# Patient Record
Sex: MALE | Race: WHITE
[De-identification: names, ages, dates, MRNs, and addresses within clinical notes are randomized per-mention and may not be internally consistent; named-entity substitution may affect disease eponyms.]

---

## 2020-07-25 ENCOUNTER — HOSPITAL ENCOUNTER (EMERGENCY)
Dept: HOSPITAL 63 - ER | Age: 40
Discharge: HOME | End: 2020-07-25
Payer: SELF-PAY

## 2020-07-25 VITALS — WEIGHT: 264.55 LBS | BODY MASS INDEX: 35.83 KG/M2 | HEIGHT: 72 IN

## 2020-07-25 VITALS — SYSTOLIC BLOOD PRESSURE: 145 MMHG | DIASTOLIC BLOOD PRESSURE: 99 MMHG
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
## 2020-07-25 DIAGNOSIS — L03.115: ICD-10-CM

## 2020-07-25 DIAGNOSIS — S80.861A: Primary | ICD-10-CM
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
## 2020-07-25 PROCEDURE — 96372 THER/PROPH/DIAG INJ SC/IM: CPT

## 2020-07-25 PROCEDURE — 99283 EMERGENCY DEPT VISIT LOW MDM: CPT

## 2020-07-25 NOTE — PHYS DOC
General Adult


EDM:


Chief Complaint:  LOWER EXT PAIN





HPI:


HPI:


40-year-old male presents with concern for cellulitis of the left leg medial 

inferior knee area.  Patient is very anxious and concerned about this.  He had a

bug bite 2 days ago and over the last 12 hours he has had increasing swelling 

and redness around the area.  It is about 5 cm in diameter.  He also has a bug 

bite on the right calf, but it is localized redness of about 1 cm.  He states 

that there was some drainage from the wound this morning that was thick.  It is 

currently serosanguineous.  Patient is very worried because he had a MRSA 

infection that involve the knee joint 6 months ago.  He was hospitalized and got

IV antibiotics.  He came in sooner this time.  Denies fever chills.





Review of Systems:


Review of Systems:


Constitutional:  Denies fever or chills 


Eyes:  Denies change in visual acuity 


HENT:  Denies nasal congestion or sore throat 


Respiratory:  Denies cough or shortness of breath 


Cardiovascular:  Denies chest pain or edema 


GI:  Denies abdominal pain, nausea, vomiting, bloody stools or diarrhea 


: Denies dysuria 


Musculoskeletal:  Denies back pain or joint pain 


Integument: Insect bites of the bilateral lower extremities.  Cellulitis of the 

left lower leg about 5 cm in diameter.  No fluctuant center.


Neurologic:  Denies headache, focal weakness or sensory changes 


Endocrine:  Denies polyuria or polydipsia 


Lymphatic:  Denies swollen glands 


Psychiatric:  Denies depression or anxiety





Heart Score:


Risk Factors:


Risk Factors:  DM, Current or recent (<one month) smoker, HTN, HLP, family 

history of CAD, obesity.


Risk Scores:


Score 0 - 3:  2.5% MACE over next 6 weeks - Discharge Home


Score 4 - 6:  20.3% MACE over next 6 weeks - Admit for Clinical Observation


Score 7 - 10:  72.7% MACE over next 6 weeks - Early Invasive Strategies





Physical Exam:


PE:





Constitutional: Well developed, well nourished, no acute distress, non-toxic 

appearance. []


HENT: Normocephalic, atraumatic, bilateral external ears normal, oropharynx mois

t, no oral exudates, nose normal. []


Eyes: PERRLA, EOMI, conjunctiva normal, no discharge. [] 


Neck: Normal range of motion, no tenderness, supple, no stridor. [] 


Cardiovascular:Heart rate regular rhythm, no murmur []


Lungs & Thorax:  Bilateral breath sounds clear to auscultation []


Abdomen: Bowel sounds normal, soft, no tenderness, no masses, no pulsatile 

masses. [] 


Skin: Warm, dry, no erythema, no rash. [] 


Back: No tenderness, no CVA tenderness. [] 


Extremities: No tenderness, no cyanosis, no clubbing, ROM intact, no edema. [] 


Neurologic: Alert and oriented X 3, normal motor function, normal sensory 

function, no focal deficits noted. []


Psychologic: Affect normal, judgement normal, mood normal. []





EKG:


EKG:


[]





Radiology/Procedures:


Radiology/Procedures:


[]





Course & Med Decision Making:


Course & Med Decision Making


Pertinent Labs and Imaging studies reviewed. (See chart for details)


The patient does appear to have cellulitis.  I do not see purulent drainage, but

 it does seem like it has expanded rapidly.  And concern for staph.  I will give

 the patient a shot of Rocephin 1 g IM.  I will then discharge him with 7 days 

of Bactrim DS.  He understands that if this continues to worsen or if he 

develops of fever, that he will come back to the emergency room and be admitted 

for IV antibiotics.  He is stable for discharge at this time.


[]





Dragon Disclaimer:


Dragon Disclaimer:


This electronic medical record was generated, in whole or in part, using a voice

 recognition dictation system.





Departure


Departure:


Impression:  


   Primary Impression:  


   Cellulitis of left lower extremity


Disposition:  01 HOME/RESIDENCE PRIOR TO ADM


Condition:  STABLE


Referrals:  


PCP,NO (PCP)


Patient Instructions:  Cellulitis, Easy-to-Read


Scripts


Sulfamethoxazole/Trimethoprim (BACTRIM DS TABLET) 1 Each Tablet


1 TAB PO BID for cellulitis for 7 Days, #14 TAB 0 Refills


   Prov: RAFAT CARDENAS DO         7/25/20





Justification of Admission:


Justification of Admission:


Justification of Admission Dx:  N/A











RAFAT CARDENAS DO                 Jul 25, 2020 17:07

## 2020-08-05 ENCOUNTER — HOSPITAL ENCOUNTER (EMERGENCY)
Dept: HOSPITAL 63 - ER | Age: 40
Discharge: HOME | End: 2020-08-05
Payer: SELF-PAY

## 2020-08-05 VITALS — HEIGHT: 72 IN | BODY MASS INDEX: 35.35 KG/M2 | WEIGHT: 261.03 LBS

## 2020-08-05 VITALS — DIASTOLIC BLOOD PRESSURE: 88 MMHG | SYSTOLIC BLOOD PRESSURE: 122 MMHG

## 2020-08-05 DIAGNOSIS — L03.115: Primary | ICD-10-CM

## 2020-08-05 DIAGNOSIS — Z91.040: ICD-10-CM

## 2020-08-05 DIAGNOSIS — Z88.0: ICD-10-CM

## 2020-08-05 PROCEDURE — 99283 EMERGENCY DEPT VISIT LOW MDM: CPT

## 2020-08-05 NOTE — PHYS DOC
Past History


Past Medical History:  No Pertinent History


Past Surgical History:  Other


Additional Past Surgical Histo:  bilat hand surgery- tendon reconstruction on L,

finger reconstruction on R


Alcohol Use:  None





Adult General


Chief Complaint


Chief Complaint:  LOWER EXT PAIN





HPI


HPI





Patient is a 39yo M who presents for RLE cellulitis. He was recently seen in our

ED and given RX bactrim. He took this to completion with moderate relief in 

symptoms but understood return precautions offered, and returned today because 

x1 site of open drainage became more red >5cm. He has known MRSA. Denies 

constitutional symptoms such as fever, HA, SHOB, CP, AP.





Review of Systems


Review of Systems


Fourteen body systems has been reviewed. See HPI for pertinent positives and 

negative responses, other wise all other systems are negative, non pertinent or 

non contributory





Allergies


Allergies





Allergies








Coded Allergies Type Severity Reaction Last Updated Verified


 


  Penicillins Allergy Intermediate  7/25/20 Yes


 


  latex Allergy Unknown  7/25/20 Yes


 


Uncoded Allergies Type Severity Reaction Last Updated Verified


 


  RUBBER Allergy Unknown  7/25/20 











Physical Exam


Physical Exam





Constitutional: Well developed, well nourished, no acute distress, non-toxic 

appearance. []


HENT: Normocephalic, atraumatic, bilateral external ears normal, oropharynx 

moist, no oral exudates, nose normal. []


Eyes: PERRLA, EOMI, conjunctiva normal, no discharge. [] 


Neck: Normal range of motion, no tenderness, supple, no stridor. [] 


Cardiovascular:Heart rate regular rhythm, no murmur []


Lungs & Thorax:  Bilateral breath sounds clear to auscultation []


Abdomen: Bowel sounds normal, soft, no tenderness, no masses, no pulsatile 

masses. [] 


Skin: Warm, dry, no erythema, no rash. x1 site of open sore on lateral right 

thigh that is >5cm erythema without fluctuance underneath, mild purulent exudate

 able to be expressed[] 


Back: No tenderness, no CVA tenderness. [] 


Extremities: No tenderness, no cyanosis, no clubbing, ROM intact, no edema. [] 


Neurologic: Alert and oriented X 3, normal motor function, normal sensory 

function, no focal deficits noted. []


Psychologic: Affect normal, judgement normal, mood normal. []





Current Patient Data


Vital Signs





                                   Vital Signs








  Date Time  Temp Pulse Resp B/P (MAP) Pulse Ox O2 Delivery O2 Flow Rate FiO2


 


8/5/20 15:20 97.8 84 18 122/88 (99) 95 Room Air  











EKG


EKG


[]





Radiology/Procedures


Radiology/Procedures


[]





Course & Med Decision Making


Course & Med Decision Making


Presentation most consistent with simple cellulitis.


Given History, Exam, and Workup I have low suspicion for Necrotizing Fasciitis, 

Abscess, Osteomyelitis, DVT or other emergent problem as a cause for this 

presentation.


Doxycycline given recent Bactrim use


Disposition: Discharge. No evidence of serious bacterial illness. Nontoxic 

appearing, VSS. Strict return precautions discussed with patient with full 

understanding. Advised patient to follow up promptly with primary care provider 

within next 48 hours.He did not have one so list of local PCPs given for him to 

call and establish care





Dragon Disclaimer


Zamzam Disclaimer


This electronic medical record was generated, in whole or in part, using a voice

 recognition dictation system.





Departure


Departure:


Impression:  


   Primary Impression:  


   Cellulitis of right lower extremity without foot


Disposition:  01 HOME/RESIDENCE PRIOR TO ADM


Condition:  STABLE


Referrals:  


PCP,NO (PCP)


As instructed at your discharge, please use the attached sheet to find a PCP of 

your liking, call, and set up outpatient follow-up for the upcoming 1 to 7 days


Patient Instructions:  Cellulitis, MRSA Overview


Scripts


Doxycycline Hyclate (DOXYCYCLINE HYCLATE) 100 Mg Tablet


1 TAB PO BID for cellulitis, #14 TAB


   Prov: KALIN COMER DO         8/5/20





Justification of Admission:


Justification of Admission:


Justification of Admission Dx:  N/A











KALIN COMER DO                  Aug 5, 2020 15:40